# Patient Record
Sex: MALE | Race: WHITE | NOT HISPANIC OR LATINO | URBAN - METROPOLITAN AREA
[De-identification: names, ages, dates, MRNs, and addresses within clinical notes are randomized per-mention and may not be internally consistent; named-entity substitution may affect disease eponyms.]

---

## 2023-04-01 LAB
ALANINE AMINOTRANSFERASE (SGPT) (U/L) IN SER/PLAS: 20 U/L (ref 10–52)
ALBUMIN (G/DL) IN SER/PLAS: 4.4 G/DL (ref 3.4–5)
ALKALINE PHOSPHATASE (U/L) IN SER/PLAS: 70 U/L (ref 33–120)
ANION GAP IN SER/PLAS: 8 MMOL/L (ref 10–20)
ASPARTATE AMINOTRANSFERASE (SGOT) (U/L) IN SER/PLAS: 18 U/L (ref 9–39)
BASOPHILS (10*3/UL) IN BLOOD BY AUTOMATED COUNT: 0.03 X10E9/L (ref 0–0.1)
BASOPHILS/100 LEUKOCYTES IN BLOOD BY AUTOMATED COUNT: 0.5 % (ref 0–2)
BILIRUBIN TOTAL (MG/DL) IN SER/PLAS: 0.7 MG/DL (ref 0–1.2)
CALCIUM (MG/DL) IN SER/PLAS: 9.6 MG/DL (ref 8.6–10.3)
CARBON DIOXIDE, TOTAL (MMOL/L) IN SER/PLAS: 28 MMOL/L (ref 21–32)
CHLORIDE (MMOL/L) IN SER/PLAS: 107 MMOL/L (ref 98–107)
CHOLESTEROL (MG/DL) IN SER/PLAS: 146 MG/DL (ref 0–199)
CHOLESTEROL IN HDL (MG/DL) IN SER/PLAS: 46.4 MG/DL
CHOLESTEROL/HDL RATIO: 3.1
CREATININE (MG/DL) IN SER/PLAS: 1.11 MG/DL (ref 0.5–1.3)
EOSINOPHILS (10*3/UL) IN BLOOD BY AUTOMATED COUNT: 0.3 X10E9/L (ref 0–0.7)
EOSINOPHILS/100 LEUKOCYTES IN BLOOD BY AUTOMATED COUNT: 5.3 % (ref 0–6)
ERYTHROCYTE DISTRIBUTION WIDTH (RATIO) BY AUTOMATED COUNT: 14.6 % (ref 11.5–14.5)
ERYTHROCYTE MEAN CORPUSCULAR HEMOGLOBIN CONCENTRATION (G/DL) BY AUTOMATED: 31.7 G/DL (ref 32–36)
ERYTHROCYTE MEAN CORPUSCULAR VOLUME (FL) BY AUTOMATED COUNT: 90 FL (ref 80–100)
ERYTHROCYTES (10*6/UL) IN BLOOD BY AUTOMATED COUNT: 4.86 X10E12/L (ref 4.5–5.9)
GFR MALE: 76 ML/MIN/1.73M2
GLUCOSE (MG/DL) IN SER/PLAS: 101 MG/DL (ref 74–99)
HEMATOCRIT (%) IN BLOOD BY AUTOMATED COUNT: 43.8 % (ref 41–52)
HEMOGLOBIN (G/DL) IN BLOOD: 13.9 G/DL (ref 13.5–17.5)
IMMATURE GRANULOCYTES/100 LEUKOCYTES IN BLOOD BY AUTOMATED COUNT: 0.2 % (ref 0–0.9)
LDL: 84 MG/DL (ref 0–99)
LEUKOCYTES (10*3/UL) IN BLOOD BY AUTOMATED COUNT: 5.7 X10E9/L (ref 4.4–11.3)
LYMPHOCYTES (10*3/UL) IN BLOOD BY AUTOMATED COUNT: 1.91 X10E9/L (ref 1.2–4.8)
LYMPHOCYTES/100 LEUKOCYTES IN BLOOD BY AUTOMATED COUNT: 33.7 % (ref 13–44)
MONOCYTES (10*3/UL) IN BLOOD BY AUTOMATED COUNT: 0.36 X10E9/L (ref 0.1–1)
MONOCYTES/100 LEUKOCYTES IN BLOOD BY AUTOMATED COUNT: 6.3 % (ref 2–10)
NEUTROPHILS (10*3/UL) IN BLOOD BY AUTOMATED COUNT: 3.06 X10E9/L (ref 1.2–7.7)
NEUTROPHILS/100 LEUKOCYTES IN BLOOD BY AUTOMATED COUNT: 54 % (ref 40–80)
NRBC (PER 100 WBCS) BY AUTOMATED COUNT: 0 /100 WBC (ref 0–0)
PLATELETS (10*3/UL) IN BLOOD AUTOMATED COUNT: 228 X10E9/L (ref 150–450)
POTASSIUM (MMOL/L) IN SER/PLAS: 4.4 MMOL/L (ref 3.5–5.3)
PROSTATE SPECIFIC AG (NG/ML) IN SER/PLAS: 0.69 NG/ML (ref 0–4)
PROTEIN TOTAL: 7 G/DL (ref 6.4–8.2)
SODIUM (MMOL/L) IN SER/PLAS: 139 MMOL/L (ref 136–145)
TRIGLYCERIDE (MG/DL) IN SER/PLAS: 77 MG/DL (ref 0–149)
UREA NITROGEN (MG/DL) IN SER/PLAS: 18 MG/DL (ref 6–23)
VLDL: 15 MG/DL (ref 0–40)

## 2024-06-24 PROBLEM — E78.5 HYPERLIPIDEMIA: Status: ACTIVE | Noted: 2024-06-24

## 2024-06-24 PROBLEM — R06.02 SHORTNESS OF BREATH: Status: ACTIVE | Noted: 2024-06-24

## 2024-06-24 PROBLEM — K21.00 CHRONIC REFLUX ESOPHAGITIS: Status: ACTIVE | Noted: 2024-06-24

## 2024-06-24 PROBLEM — I25.10 CORONARY ARTERY DISEASE: Status: ACTIVE | Noted: 2024-06-24

## 2024-07-10 NOTE — PROGRESS NOTES
Subjective   Zachayr Kaur is a 58 y.o. male.    Chief Complaint:  Follow-up coronary artery disease.    HPI    Continues to feel well.  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  Denies any chest pressure or chest heaviness.  No other medical problems or medical illnesses over the past year.    His diagnosis of coronary artery disease is based on a positive CT calcium score 563.  This study was done in .    Risk factors for the presence of coronary artery disease include hyperlipidemia.He has a positive family history of heart disease in that a sister  suddenly in her mid 50s. Brother  of congestive heart failure.     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     His coronary artery disease diagnosis is based on a calcium score of 563 consistent with extensive atherosclerotic coronary artery disease. This was done in .    Allergies  Medication    · No Known Drug Allergies   Recorded By: Haylee Jara; 10/3/2016 12:47:42 PM     Family History  Mother    · No pertinent family history  Father    · Family history of malignant neoplasm of esophagus (V16.0) (Z80.0)  Sister    · Family history of cerebrovascular accident (CVA) (V17.1) (Z82.3)   · Family history of myocardial infarction (V17.3) (Z82.49)     Social History  Problems    · Consumes alcohol occasionally (V49.89) (Z78.9)   · Does not use illicit drugs (V49.89) (Z78.9)   · Denied: History of Drug use   · Never a smoker   · Occasional alcohol use       Review of Systems   All other systems reviewed and are negative.      Current Outpatient Medications   Medication Sig Dispense Refill    aspirin 81 mg EC tablet Take 1 tablet (81 mg) by mouth once daily.      rosuvastatin (Crestor) 40 mg tablet Take 1 tablet (40 mg) by mouth once  "daily. 90 tablet 3     No current facility-administered medications for this visit.        Visit Vitals  /80 (BP Location: Left arm)   Pulse 64   Ht 1.676 m (5' 6\")   Wt 98.4 kg (217 lb)   SpO2 94%   BMI 35.02 kg/m²   Smoking Status Never   BSA 2.14 m²        Objective     Constitutional:       Appearance: Not in distress.   Neck:      Vascular: JVD normal.   Pulmonary:      Breath sounds: Normal breath sounds.   Cardiovascular:      Normal rate. Regular rhythm. S1 with normal intensity. S2 with normal intensity.       Murmurs: There is no murmur.      No gallop.    Pulses:     Intact distal pulses.   Edema:     Peripheral edema absent.   Abdominal:      General: Bowel sounds are normal.   Neurological:      Mental Status: Alert and oriented to person, place and time.         Lab Review:   Lab Results   Component Value Date     04/01/2023    K 4.4 04/01/2023     04/01/2023    CO2 28 04/01/2023    BUN 18 04/01/2023    CREATININE 1.11 04/01/2023    GLUCOSE 101 (H) 04/01/2023    CALCIUM 9.6 04/01/2023         Assessment:    1.  Coronary disease.  Extensive coronary artery disease on the basis of prior CT calcium score.  However he remains asymptomatic.  No anginal symptoms.  Is very active at his work and at his side job landscaping.  As long as he remains asymptomatic, we will continue current strategy.  We discussed with him that should he have any symptoms he is to call us or go to the emergency room.    2.  Hyperlipidemia.  Latest labs show cholesterol 122, HDL 43, LDL 50  "

## 2024-07-11 ENCOUNTER — APPOINTMENT (OUTPATIENT)
Dept: CARDIOLOGY | Facility: CLINIC | Age: 59
End: 2024-07-11
Payer: COMMERCIAL

## 2024-07-11 VITALS
HEART RATE: 64 BPM | SYSTOLIC BLOOD PRESSURE: 122 MMHG | BODY MASS INDEX: 34.87 KG/M2 | HEIGHT: 66 IN | WEIGHT: 217 LBS | OXYGEN SATURATION: 94 % | DIASTOLIC BLOOD PRESSURE: 80 MMHG

## 2024-07-11 DIAGNOSIS — R06.02 SHORTNESS OF BREATH: Primary | ICD-10-CM

## 2024-07-11 DIAGNOSIS — I25.10 CORONARY ARTERY DISEASE INVOLVING NATIVE CORONARY ARTERY OF NATIVE HEART WITHOUT ANGINA PECTORIS: ICD-10-CM

## 2024-07-11 DIAGNOSIS — E78.5 HYPERLIPIDEMIA, UNSPECIFIED HYPERLIPIDEMIA TYPE: ICD-10-CM

## 2024-07-11 PROCEDURE — 1036F TOBACCO NON-USER: CPT | Performed by: INTERNAL MEDICINE

## 2024-07-11 PROCEDURE — 93000 ELECTROCARDIOGRAM COMPLETE: CPT | Performed by: INTERNAL MEDICINE

## 2024-07-11 PROCEDURE — 99213 OFFICE O/P EST LOW 20 MIN: CPT | Performed by: INTERNAL MEDICINE

## 2024-07-11 RX ORDER — ROSUVASTATIN CALCIUM 40 MG/1
40 TABLET, COATED ORAL DAILY
COMMUNITY
End: 2024-07-11 | Stop reason: SDUPTHER

## 2024-07-11 RX ORDER — ROSUVASTATIN CALCIUM 40 MG/1
40 TABLET, COATED ORAL DAILY
Qty: 90 TABLET | Refills: 3 | Status: SHIPPED | OUTPATIENT
Start: 2024-07-11 | End: 2025-07-11

## 2024-07-11 RX ORDER — ASPIRIN 81 MG/1
81 TABLET ORAL DAILY
COMMUNITY

## 2024-07-11 NOTE — PATIENT INSTRUCTIONS
No changes in your medications.    If you develop chest pressure or heaviness call us or go to the emergency room.

## 2025-03-12 ENCOUNTER — OFFICE VISIT (OUTPATIENT)
Dept: URGENT CARE | Age: 60
End: 2025-03-12
Payer: COMMERCIAL

## 2025-03-12 ENCOUNTER — ANCILLARY PROCEDURE (OUTPATIENT)
Dept: URGENT CARE | Age: 60
End: 2025-03-12
Payer: COMMERCIAL

## 2025-03-12 VITALS
WEIGHT: 230 LBS | OXYGEN SATURATION: 97 % | BODY MASS INDEX: 34.86 KG/M2 | HEART RATE: 68 BPM | TEMPERATURE: 98.6 F | HEIGHT: 68 IN | SYSTOLIC BLOOD PRESSURE: 121 MMHG | DIASTOLIC BLOOD PRESSURE: 83 MMHG | RESPIRATION RATE: 17 BRPM

## 2025-03-12 DIAGNOSIS — S90.512A ABRASION OF LEFT ANKLE, INITIAL ENCOUNTER: ICD-10-CM
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
## 2025-03-12 DIAGNOSIS — S99.912A ANKLE INJURY, LEFT, INITIAL ENCOUNTER: Primary | ICD-10-CM

## 2025-03-12 DIAGNOSIS — S93.402A SPRAIN OF LEFT ANKLE, UNSPECIFIED LIGAMENT, INITIAL ENCOUNTER: ICD-10-CM

## 2025-03-12 PROCEDURE — 73610 X-RAY EXAM OF ANKLE: CPT | Mod: LEFT SIDE | Performed by: PHYSICIAN ASSISTANT

## 2025-03-12 RX ORDER — FENOFIBRIC ACID 45 MG/1
45 CAPSULE, DELAYED RELEASE ORAL
COMMUNITY
Start: 2024-04-03

## 2025-03-12 ASSESSMENT — PATIENT HEALTH QUESTIONNAIRE - PHQ9
1. LITTLE INTEREST OR PLEASURE IN DOING THINGS: NOT AT ALL
2. FEELING DOWN, DEPRESSED OR HOPELESS: NOT AT ALL
SUM OF ALL RESPONSES TO PHQ9 QUESTIONS 1 AND 2: 0

## 2025-03-12 ASSESSMENT — ENCOUNTER SYMPTOMS
GASTROINTESTINAL NEGATIVE: 1
ENDOCRINE NEGATIVE: 1
RESPIRATORY NEGATIVE: 1
CONSTITUTIONAL NEGATIVE: 1
ARTHRALGIAS: 1
ALLERGIC/IMMUNOLOGIC NEGATIVE: 1
PSYCHIATRIC NEGATIVE: 1
NEUROLOGICAL NEGATIVE: 1
CARDIOVASCULAR NEGATIVE: 1
WOUND: 1
HEMATOLOGIC/LYMPHATIC NEGATIVE: 1
EYES NEGATIVE: 1
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
## 2025-03-12 NOTE — PATIENT INSTRUCTIONS
Bacitracin and dry dressing daily  Ace wrap for comfort  Ice and elevate ankle 2-3 times a day  Motrin or aleve as directed for pain  Pcp follow up this week if not improving or worsening  ER visit anytime 24/7 for acute worsening or changing condition

## 2025-07-02 PROBLEM — E66.811 OBESITY, CLASS I, BMI 30-34.9: Status: ACTIVE | Noted: 2018-06-05

## 2025-07-02 PROBLEM — I10 ESSENTIAL HYPERTENSION, BENIGN: Status: ACTIVE | Noted: 2017-12-05

## 2025-07-02 PROBLEM — R07.9 CHEST PAIN: Status: ACTIVE | Noted: 2025-04-25

## 2025-07-15 NOTE — PROGRESS NOTES
Subjective   Zachary Kaur is a 59 y.o. male.    Chief Complaint:  Hospital follow-up for chest pain.    HPI    He presented to the hospital on 2025 with chest pain.  He describes sudden onset of left midsternal and anterior chest discomfort.  He was taking a shower when he got the pain.  He was seen in the [facility] emergency room.  His EKG was normal.  Troponins were negative.  He underwent a stress echo study which was normal.  He was subsequently discharged home.  During his workup he was found to have a 1.2 cm nodule.  He had a follow-up nuclear PET scan which was negative.  Since discharge he has had no reoccurrence of the chest discomfort.  Continues to do a lot of walking at work and also has a part-time landscaping job.  Does not get any anginal symptoms with his activities at work.    His diagnosis of coronary artery disease is based on a positive CT calcium score 563.  This study was done in .     Risk factors for the presence of coronary artery disease include hyperlipidemia.He has a positive family history of heart disease in that a sister  suddenly in her mid 50s. Brother  of congestive heart failure.     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      Allergies  Medication    · No Known Drug Allergies     Family History  Mother    · No pertinent family history  Father    · No history of premature CAD  Sister    · Family history of cerebrovascular accident (CVA) (V17.1) (Z82.3)   · Family history of myocardial infarction (V17.3) (Z82.49)     Social History  Problems · Consumes alcohol occasionally (V49.89) (Z78.9)   · Never a smoker   · Occasional alcohol use    Review of Systems   Cardiovascular:  Positive for chest pain.   Musculoskeletal:  Positive for arthritis.   All other systems reviewed and are negative.      Current Medications[1]     Visit Vitals  BP  "128/76   Pulse 64   Ht 1.727 m (5' 8\")   Wt 103 kg (227 lb)   SpO2 93%   BMI 34.52 kg/m²   Smoking Status Never   BSA 2.22 m²        Objective     Constitutional:       Appearance: Not in distress.   Neck:      Vascular: JVD normal.   Pulmonary:      Breath sounds: Normal breath sounds.   Cardiovascular:      Normal rate. Regular rhythm. S1 with normal intensity. S2 with normal intensity.       Murmurs: There is no murmur.      No gallop.    Pulses:     Intact distal pulses.   Edema:     Peripheral edema absent.   Abdominal:      General: Bowel sounds are normal.   Neurological:      Mental Status: Alert and oriented to person, place and time.       Lab Review:   Lab Results   Component Value Date     04/01/2023    K 4.4 04/01/2023     04/01/2023    CO2 28 04/01/2023    BUN 18 04/01/2023    CREATININE 1.11 04/01/2023    GLUCOSE 101 (H) 04/01/2023    CALCIUM 9.6 04/01/2023     Lab Results   Component Value Date    WBC 5.7 04/01/2023    HGB 13.9 04/01/2023    HCT 43.8 04/01/2023    MCV 90 04/01/2023     04/01/2023     Lab Results   Component Value Date    CHOL 146 04/01/2023    TRIG 77 04/01/2023    HDL 46.4 04/01/2023       Assessment:    1.  Coronary artery disease.  Extensive coronary artery disease based on prior testing.  Stress echo study in April was negative for ischemia.  No further workup planned.  Should he have reoccurrence of the chest discomfort we would proceed with coronary CT angiography.    2.  Hypertension.  Blood pressures are normal.    3.  Hyperlipidemia.  Cholesterol is 146, HDL 40, LDL 76.  Prior lipid profile showed an LDL of 50.    4.  Pulmonary nodule.  Negative PET scan.  No history of smoking.         [1]   Current Outpatient Medications   Medication Sig Dispense Refill    aspirin 81 mg EC tablet Take 1 tablet (81 mg) by mouth once daily.      choline fenofibrate (Trilipix) 45 mg DR capsule Take 1 capsule (45 mg) by mouth once daily.      rosuvastatin (Crestor) 40 mg " tablet Take 1 tablet (40 mg) by mouth once daily. 90 tablet 3     No current facility-administered medications for this visit.

## 2025-07-16 ENCOUNTER — APPOINTMENT (OUTPATIENT)
Dept: CARDIOLOGY | Facility: CLINIC | Age: 60
End: 2025-07-16
Payer: COMMERCIAL

## 2025-07-16 VITALS
HEART RATE: 64 BPM | DIASTOLIC BLOOD PRESSURE: 76 MMHG | WEIGHT: 227 LBS | HEIGHT: 68 IN | BODY MASS INDEX: 34.4 KG/M2 | SYSTOLIC BLOOD PRESSURE: 128 MMHG | OXYGEN SATURATION: 93 %

## 2025-07-16 DIAGNOSIS — E78.5 HYPERLIPIDEMIA, UNSPECIFIED HYPERLIPIDEMIA TYPE: ICD-10-CM

## 2025-07-16 DIAGNOSIS — E78.5 HYPERLIPIDEMIA, UNSPECIFIED HYPERLIPIDEMIA TYPE: Primary | ICD-10-CM

## 2025-07-16 DIAGNOSIS — I25.10 CORONARY ARTERY DISEASE INVOLVING NATIVE CORONARY ARTERY OF NATIVE HEART WITHOUT ANGINA PECTORIS: ICD-10-CM

## 2025-07-16 DIAGNOSIS — I10 ESSENTIAL HYPERTENSION, BENIGN: ICD-10-CM

## 2025-07-16 DIAGNOSIS — R07.89 ATYPICAL CHEST PAIN: ICD-10-CM

## 2025-07-16 PROBLEM — R07.9 CHEST PAIN: Status: RESOLVED | Noted: 2025-04-25 | Resolved: 2025-07-16

## 2025-07-16 LAB
ATRIAL RATE: 61 BPM
P AXIS: 56 DEGREES
P OFFSET: 162 MS
P ONSET: 112 MS
PR INTERVAL: 194 MS
Q ONSET: 209 MS
QRS COUNT: 10 BEATS
QRS DURATION: 104 MS
QT INTERVAL: 384 MS
QTC CALCULATION(BAZETT): 386 MS
QTC FREDERICIA: 385 MS
R AXIS: 15 DEGREES
T AXIS: 11 DEGREES
T OFFSET: 401 MS
VENTRICULAR RATE: 61 BPM

## 2025-07-16 PROCEDURE — 3074F SYST BP LT 130 MM HG: CPT | Performed by: INTERNAL MEDICINE

## 2025-07-16 PROCEDURE — 99202 OFFICE O/P NEW SF 15 MIN: CPT

## 2025-07-16 PROCEDURE — 99214 OFFICE O/P EST MOD 30 MIN: CPT | Performed by: INTERNAL MEDICINE

## 2025-07-16 PROCEDURE — 1036F TOBACCO NON-USER: CPT | Performed by: INTERNAL MEDICINE

## 2025-07-16 PROCEDURE — 3008F BODY MASS INDEX DOCD: CPT | Performed by: INTERNAL MEDICINE

## 2025-07-16 PROCEDURE — 93005 ELECTROCARDIOGRAM TRACING: CPT | Performed by: INTERNAL MEDICINE

## 2025-07-16 PROCEDURE — 3078F DIAST BP <80 MM HG: CPT | Performed by: INTERNAL MEDICINE

## 2025-07-16 RX ORDER — ROSUVASTATIN CALCIUM 40 MG/1
40 TABLET, COATED ORAL DAILY
Qty: 90 TABLET | Refills: 3 | Status: SHIPPED | OUTPATIENT
Start: 2025-07-16 | End: 2026-07-16
